# Patient Record
Sex: MALE | Race: WHITE | NOT HISPANIC OR LATINO | Employment: FULL TIME | URBAN - METROPOLITAN AREA
[De-identification: names, ages, dates, MRNs, and addresses within clinical notes are randomized per-mention and may not be internally consistent; named-entity substitution may affect disease eponyms.]

---

## 2022-10-05 ENCOUNTER — TELEPHONE (OUTPATIENT)
Dept: SCHEDULING | Facility: IMAGING CENTER | Age: 57
End: 2022-10-05

## 2022-10-12 ENCOUNTER — OFFICE VISIT (OUTPATIENT)
Dept: MEDICAL GROUP | Facility: PHYSICIAN GROUP | Age: 57
End: 2022-10-12
Payer: COMMERCIAL

## 2022-10-12 VITALS
SYSTOLIC BLOOD PRESSURE: 120 MMHG | OXYGEN SATURATION: 97 % | TEMPERATURE: 98.5 F | WEIGHT: 277.8 LBS | BODY MASS INDEX: 33.83 KG/M2 | HEART RATE: 69 BPM | HEIGHT: 76 IN | DIASTOLIC BLOOD PRESSURE: 70 MMHG

## 2022-10-12 DIAGNOSIS — Z12.11 SCREENING FOR COLON CANCER: ICD-10-CM

## 2022-10-12 DIAGNOSIS — R60.0 LOWER EXTREMITY EDEMA: ICD-10-CM

## 2022-10-12 DIAGNOSIS — E66.9 OBESITY (BMI 30-39.9): ICD-10-CM

## 2022-10-12 PROBLEM — K64.8 OTHER HEMORRHOIDS: Status: ACTIVE | Noted: 2021-04-30

## 2022-10-12 PROCEDURE — 99204 OFFICE O/P NEW MOD 45 MIN: CPT

## 2022-10-12 ASSESSMENT — PATIENT HEALTH QUESTIONNAIRE - PHQ9: CLINICAL INTERPRETATION OF PHQ2 SCORE: 0

## 2022-10-13 NOTE — ASSESSMENT & PLAN NOTE
Subacute condition of uncertain prognosis  - Strongly recommend use of compression stockings daily when up and ambulatory  - Strongly recommend offloading getting both legs above the level of the heart for at least 1 to 2 hours daily  - Recommend low-salt diet  - If condition fails to improve with conservative treatment measures we will likely refer to vascular medicine in the future

## 2022-10-13 NOTE — PROGRESS NOTES
"Subjective:     CC:  Diagnoses of Obesity (BMI 30-39.9), Screening for colon cancer, and Lower extremity edema were pertinent to this visit.    HISTORY OF THE PRESENT ILLNESS: Patient is a 56 y.o. male. This pleasant patient is here today to establish care and discuss the following problems:    Problem   Obesity (Bmi 30-39.9)    This is a chronic condition.   Max weight: 287  Current weight: 277  BMI: 33.81  Diet: mostly healthy, denies fast food or sodas  Exercise: golf  Goal Weight: 255       Lower Extremity Edema    2-3 months of lower extremity swelling  - He has tried compression stockings but finds them cumbersome and difficult to maneuver.  He reports when he elevates his legs the edema subsides.  He also reports when he sleeps at night in a supine position he wakes up and there is no ankle edema.  -Denies any chest pain or shortness of breath, or calf pain       Other Hemorrhoids       Health Maintenance: Refuses vaccine administration at this time    ROS:   Review of Systems   Cardiovascular:  Positive for leg swelling (Bilateral lower extremities).   All other systems reviewed and are negative.      Objective:     Exam: /70 (BP Location: Left arm, Patient Position: Sitting, BP Cuff Size: Large adult)   Pulse 69   Temp 36.9 °C (98.5 °F) (Temporal)   Ht 1.93 m (6' 4\")   Wt (!) 126 kg (277 lb 12.8 oz)   SpO2 97%  Body mass index is 33.81 kg/m².    Physical Exam  Constitutional:       Appearance: Normal appearance.   HENT:      Head: Normocephalic.   Eyes:      Conjunctiva/sclera: Conjunctivae normal.      Pupils: Pupils are equal, round, and reactive to light.   Cardiovascular:      Rate and Rhythm: Normal rate and regular rhythm.      Heart sounds: No murmur heard.  Pulmonary:      Effort: Pulmonary effort is normal. No respiratory distress.      Breath sounds: Normal breath sounds.   Musculoskeletal:         General: Normal range of motion.      Right lower leg: Edema (1+) present.      Left " lower leg: Edema (1+) present.   Skin:     General: Skin is warm and dry.      Comments: Multiple bilateral lower extremity varicosities, right greater than left.  Also shows evidence of venous stasis ulceration with 1+ edema right lower and left lower extremities at the ankles   Neurological:      General: No focal deficit present.      Mental Status: He is alert and oriented to person, place, and time.   Psychiatric:         Mood and Affect: Mood normal.         Behavior: Behavior normal.         Labs: No recent labs    Assessment & Plan: Medical Decision Making   56 y.o. male with the following -    Problem List Items Addressed This Visit       Obesity (BMI 30-39.9)     Chronic, unstable  -Continue efforts towards healthy diet and exercise as discussed         Relevant Orders    Patient identified as having weight management issue.  Appropriate orders and counseling given.    HEMOGLOBIN A1C    CBC WITHOUT DIFFERENTIAL    Comp Metabolic Panel    Lipid Profile    TSH WITH REFLEX TO FT4    Referral to Nutrition Services    Lower extremity edema     Subacute condition of uncertain prognosis  - Strongly recommend use of compression stockings daily when up and ambulatory  - Strongly recommend offloading getting both legs above the level of the heart for at least 1 to 2 hours daily  - Recommend low-salt diet  - If condition fails to improve with conservative treatment measures we will likely refer to vascular medicine in the future          Other Visit Diagnoses       Screening for colon cancer        Relevant Orders    COLOGUARD (FIT DNA)            Differential diagnosis, natural history, supportive care, and indications for immediate follow-up discussed.  Shared decision making approach was utilized, and patient is amendable with plan of care.  Patient understands to return to clinic or go to the emergency department if symptoms worsen. All questions and concerns addressed.      Return in about 4 weeks (around  11/9/2022).    Please note that this dictation was created using voice recognition software. I have made every reasonable attempt to correct obvious errors, but I expect that there are errors of grammar and possibly content that I did not discover before finalizing the note.

## 2022-10-17 ENCOUNTER — HOSPITAL ENCOUNTER (OUTPATIENT)
Dept: LAB | Facility: MEDICAL CENTER | Age: 57
End: 2022-10-17
Payer: COMMERCIAL

## 2022-10-17 DIAGNOSIS — E66.9 OBESITY (BMI 30-39.9): ICD-10-CM

## 2022-10-17 LAB
ALBUMIN SERPL BCP-MCNC: 4.7 G/DL (ref 3.2–4.9)
ALBUMIN/GLOB SERPL: 2 G/DL
ALP SERPL-CCNC: 48 U/L (ref 30–99)
ALT SERPL-CCNC: 30 U/L (ref 2–50)
ANION GAP SERPL CALC-SCNC: 11 MMOL/L (ref 7–16)
AST SERPL-CCNC: 22 U/L (ref 12–45)
BILIRUB SERPL-MCNC: 0.7 MG/DL (ref 0.1–1.5)
BUN SERPL-MCNC: 16 MG/DL (ref 8–22)
CALCIUM SERPL-MCNC: 9.4 MG/DL (ref 8.5–10.5)
CHLORIDE SERPL-SCNC: 101 MMOL/L (ref 96–112)
CHOLEST SERPL-MCNC: 192 MG/DL (ref 100–199)
CO2 SERPL-SCNC: 26 MMOL/L (ref 20–33)
CREAT SERPL-MCNC: 0.94 MG/DL (ref 0.5–1.4)
ERYTHROCYTE [DISTWIDTH] IN BLOOD BY AUTOMATED COUNT: 42.7 FL (ref 35.9–50)
EST. AVERAGE GLUCOSE BLD GHB EST-MCNC: 100 MG/DL
GFR SERPLBLD CREATININE-BSD FMLA CKD-EPI: 96 ML/MIN/1.73 M 2
GLOBULIN SER CALC-MCNC: 2.3 G/DL (ref 1.9–3.5)
GLUCOSE SERPL-MCNC: 93 MG/DL (ref 65–99)
HBA1C MFR BLD: 5.1 % (ref 4–5.6)
HCT VFR BLD AUTO: 45.3 % (ref 42–52)
HDLC SERPL-MCNC: 41 MG/DL
HGB BLD-MCNC: 15.7 G/DL (ref 14–18)
LDLC SERPL CALC-MCNC: 107 MG/DL
MCH RBC QN AUTO: 32.4 PG (ref 27–33)
MCHC RBC AUTO-ENTMCNC: 34.7 G/DL (ref 33.7–35.3)
MCV RBC AUTO: 93.6 FL (ref 81.4–97.8)
PLATELET # BLD AUTO: 150 K/UL (ref 164–446)
PMV BLD AUTO: 11.6 FL (ref 9–12.9)
POTASSIUM SERPL-SCNC: 5 MMOL/L (ref 3.6–5.5)
PROT SERPL-MCNC: 7 G/DL (ref 6–8.2)
RBC # BLD AUTO: 4.84 M/UL (ref 4.7–6.1)
SODIUM SERPL-SCNC: 138 MMOL/L (ref 135–145)
TRIGL SERPL-MCNC: 222 MG/DL (ref 0–149)
TSH SERPL DL<=0.005 MIU/L-ACNC: 1.8 UIU/ML (ref 0.38–5.33)
WBC # BLD AUTO: 5.7 K/UL (ref 4.8–10.8)

## 2022-10-17 PROCEDURE — 85027 COMPLETE CBC AUTOMATED: CPT

## 2022-10-17 PROCEDURE — 83036 HEMOGLOBIN GLYCOSYLATED A1C: CPT

## 2022-10-17 PROCEDURE — 80061 LIPID PANEL: CPT

## 2022-10-17 PROCEDURE — 80053 COMPREHEN METABOLIC PANEL: CPT

## 2022-10-17 PROCEDURE — 84443 ASSAY THYROID STIM HORMONE: CPT

## 2022-10-17 PROCEDURE — 36415 COLL VENOUS BLD VENIPUNCTURE: CPT

## 2022-11-09 ENCOUNTER — OFFICE VISIT (OUTPATIENT)
Dept: MEDICAL GROUP | Facility: PHYSICIAN GROUP | Age: 57
End: 2022-11-09
Payer: COMMERCIAL

## 2022-11-09 VITALS
HEIGHT: 76 IN | TEMPERATURE: 98.2 F | BODY MASS INDEX: 34.1 KG/M2 | HEART RATE: 80 BPM | WEIGHT: 280 LBS | DIASTOLIC BLOOD PRESSURE: 70 MMHG | OXYGEN SATURATION: 96 % | SYSTOLIC BLOOD PRESSURE: 118 MMHG

## 2022-11-09 DIAGNOSIS — E66.9 OBESITY (BMI 30-39.9): ICD-10-CM

## 2022-11-09 DIAGNOSIS — D69.6 LOW PLATELET COUNT (HCC): ICD-10-CM

## 2022-11-09 DIAGNOSIS — E78.5 DYSLIPIDEMIA: ICD-10-CM

## 2022-11-09 DIAGNOSIS — R60.0 LOWER EXTREMITY EDEMA: ICD-10-CM

## 2022-11-09 PROCEDURE — 99214 OFFICE O/P EST MOD 30 MIN: CPT

## 2022-11-09 ASSESSMENT — FIBROSIS 4 INDEX: FIB4 SCORE: 1.47

## 2022-11-09 ASSESSMENT — ENCOUNTER SYMPTOMS: SHORTNESS OF BREATH: 0

## 2022-11-09 NOTE — ASSESSMENT & PLAN NOTE
Undiagnosed new condition-uncertain prognosis  -Will recheck platelets at next lab draw in 6 months

## 2022-11-09 NOTE — ASSESSMENT & PLAN NOTE
Chronic, unstable  -Recommend diligent efforts towards diet and exercise as discussed  -Recommend 30 minutes sustained cardiovascular activity daily

## 2022-11-09 NOTE — ASSESSMENT & PLAN NOTE
Subacute condition of uncertain prognosis  - Strongly recommend use of compression stockings daily when up and ambulatory  - Strongly recommend offloading getting both legs above the level of the heart for at least 1 to 2 hours daily  - Recommend low-salt diet  -Referral to vascular medicine  To cardiology

## 2022-11-09 NOTE — ASSESSMENT & PLAN NOTE
Chronic, unstable  -Continue efforts towards healthy diet and exercise as discussed  -Nutritional consult placed at last appointment.  Patient needs to call to schedule.

## 2022-11-09 NOTE — PROGRESS NOTES
"Subjective:     CC: lab review and lower extremity swelling    HPI:   Reinaldo presents today with    Problem   Dyslipidemia    Reports to eat relatively healthy.  -Patient active, plays golf 4 days per week walks and rides in the cart     Latest Reference Range & Units 10/17/22 12:36   Cholesterol,Tot 100 - 199 mg/dL 192   Triglycerides 0 - 149 mg/dL 222 (H)   HDL >=40 mg/dL 41   LDL <100 mg/dL 107 (H)        Low Platelet Count (Hcc)    Platelet level at last lab draw 150.  Patient denies any history of easy bruising, or active bleeding.  He has never been told that he has low platelets.  We will continue to watch trends     Obesity (Bmi 30-39.9)    This is a chronic condition.   Max weight: 287  Current weight: 280  BMI: 34.08  Diet: mostly healthy, denies fast food or sodas  Exercise: golf  Goal Weight: 255       Lower Extremity Edema    2-3 months of lower extremity swelling  - He has tried compression stockings but finds them cumbersome and difficult to maneuver.  He reports when he elevates his legs the edema subsides.  He also reports when he sleeps at night in a supine position he wakes up and there is no ankle edema.  -Denies any chest pain or shortness of breath, or calf pain           Health Maintenance: declines vaccines today    ROS:  Review of Systems   Respiratory:  Negative for shortness of breath.    Cardiovascular:  Positive for leg swelling. Negative for chest pain.   All other systems reviewed and are negative.    Objective:     Exam:  /70 (BP Location: Left arm, Patient Position: Sitting, BP Cuff Size: Large adult)   Pulse 80   Temp 36.8 °C (98.2 °F) (Temporal)   Ht 1.93 m (6' 4\")   Wt (!) 127 kg (280 lb)   SpO2 96%   BMI 34.08 kg/m²  Body mass index is 34.08 kg/m².    Physical Exam    Labs:   Hospital Outpatient Visit on 10/17/2022   Component Date Value    TSH 10/17/2022 1.800     Cholesterol,Tot 10/17/2022 192     Triglycerides 10/17/2022 222 (H)     HDL 10/17/2022 41     LDL " 10/17/2022 107 (H)     Sodium 10/17/2022 138     Potassium 10/17/2022 5.0     Chloride 10/17/2022 101     Co2 10/17/2022 26     Anion Gap 10/17/2022 11.0     Glucose 10/17/2022 93     Bun 10/17/2022 16     Creatinine 10/17/2022 0.94     Calcium 10/17/2022 9.4     AST(SGOT) 10/17/2022 22     ALT(SGPT) 10/17/2022 30     Alkaline Phosphatase 10/17/2022 48     Total Bilirubin 10/17/2022 0.7     Albumin 10/17/2022 4.7     Total Protein 10/17/2022 7.0     Globulin 10/17/2022 2.3     A-G Ratio 10/17/2022 2.0     WBC 10/17/2022 5.7     RBC 10/17/2022 4.84     Hemoglobin 10/17/2022 15.7     Hematocrit 10/17/2022 45.3     MCV 10/17/2022 93.6     MCH 10/17/2022 32.4     MCHC 10/17/2022 34.7     RDW 10/17/2022 42.7     Platelet Count 10/17/2022 150 (L)     MPV 10/17/2022 11.6     Glycohemoglobin 10/17/2022 5.1     Est Avg Glucose 10/17/2022 100     GFR (CKD-EPI) 10/17/2022 96          Assessment & Plan: Medical Decision Making     57 y.o. male with the following -     Problem List Items Addressed This Visit       Obesity (BMI 30-39.9)     Chronic, unstable  -Continue efforts towards healthy diet and exercise as discussed  -Nutritional consult placed at last appointment.  Patient needs to call to schedule.         Lower extremity edema     Subacute condition of uncertain prognosis  - Strongly recommend use of compression stockings daily when up and ambulatory  - Strongly recommend offloading getting both legs above the level of the heart for at least 1 to 2 hours daily  - Recommend low-salt diet  -Referral to vascular medicine  To cardiology         Relevant Orders    REFERRAL TO CARDIOLOGY    Dyslipidemia     Chronic, unstable  -Recommend diligent efforts towards diet and exercise as discussed  -Recommend 30 minutes sustained cardiovascular activity daily         Relevant Orders    Lipid Profile    Low platelet count (HCC)     Undiagnosed new condition-uncertain prognosis  -Will recheck platelets at next lab draw in 6  months         Relevant Orders    CBC WITH DIFFERENTIAL       Differential diagnosis, natural history, supportive care, and indications for immediate follow-up discussed.  Shared decision making approach utilized, and patient is amendable with plan of care.  Patient understands to return to clinic or go to the emergency department if symptoms worsen. All questions and concerns addressed.    Return in about 6 months (around 5/9/2023) for F/U Lab Review.    Please note that this dictation was created using voice recognition software. I have made every reasonable attempt to correct obvious errors, but I expect that there are errors of grammar and possibly content that I did not discover before finalizing the note.

## 2023-01-31 ENCOUNTER — OFFICE VISIT (OUTPATIENT)
Dept: CARDIOLOGY | Facility: MEDICAL CENTER | Age: 58
End: 2023-01-31
Payer: COMMERCIAL

## 2023-01-31 VITALS
BODY MASS INDEX: 35.07 KG/M2 | SYSTOLIC BLOOD PRESSURE: 130 MMHG | DIASTOLIC BLOOD PRESSURE: 80 MMHG | RESPIRATION RATE: 17 BRPM | HEART RATE: 74 BPM | WEIGHT: 288 LBS | HEIGHT: 76 IN | OXYGEN SATURATION: 97 %

## 2023-01-31 DIAGNOSIS — M79.604 PAIN IN BOTH LOWER EXTREMITIES: ICD-10-CM

## 2023-01-31 DIAGNOSIS — M79.605 PAIN IN BOTH LOWER EXTREMITIES: ICD-10-CM

## 2023-01-31 DIAGNOSIS — I83.813 VARICOSE VEINS OF BOTH LOWER EXTREMITIES WITH PAIN: ICD-10-CM

## 2023-01-31 LAB — EKG IMPRESSION: NORMAL

## 2023-01-31 PROCEDURE — 93000 ELECTROCARDIOGRAM COMPLETE: CPT | Performed by: INTERNAL MEDICINE

## 2023-01-31 PROCEDURE — 99204 OFFICE O/P NEW MOD 45 MIN: CPT | Performed by: INTERNAL MEDICINE

## 2023-01-31 ASSESSMENT — ENCOUNTER SYMPTOMS
EYE DISCHARGE: 0
DIZZINESS: 0
PALPITATIONS: 0
DOUBLE VISION: 0
FALLS: 0
BLOOD IN STOOL: 0
HALLUCINATIONS: 0
NAUSEA: 0
BRUISES/BLEEDS EASILY: 0
CHILLS: 0
MYALGIAS: 0
ORTHOPNEA: 0
SHORTNESS OF BREATH: 0
PND: 0
DEPRESSION: 0
EYE PAIN: 0
LOSS OF CONSCIOUSNESS: 0
VOMITING: 0
BLURRED VISION: 0
SPEECH CHANGE: 0
HEADACHES: 0
CLAUDICATION: 0
SENSORY CHANGE: 0
WEIGHT LOSS: 0
COUGH: 0
FEVER: 0
ABDOMINAL PAIN: 0

## 2023-01-31 ASSESSMENT — FIBROSIS 4 INDEX: FIB4 SCORE: 1.47

## 2023-01-31 NOTE — PROGRESS NOTES
Chief Complaint   Patient presents with    Edema     F/V DX: Lower extremity edema        Subjective     Reinaldo Prabhakar is a 57 y.o. male who presents today for evaluation due to persistent lower extremities edema and pain.  He does have significant varicose veins and he would like to seek treatment for his symptomatology.    No prior vein treatments.    Patient has been using compression stocking with some relief but not total relief.    I have personally interpreted EKG today with patient, there is no evidence of acute coronary syndrome, no evidence of prior infarct, normal NE and QT interval, no significant conduction disease. Sinus rhythm.    Past Medical History:   Diagnosis Date    Hemorrhoids      Past Surgical History:   Procedure Laterality Date    TONSILLECTOMY       Family History   Problem Relation Age of Onset    No Known Problems Mother     Heart Disease Father     Heart Disease Brother     Diabetes Brother     Hypertension Paternal Grandfather      Social History     Socioeconomic History    Marital status:      Spouse name: Not on file    Number of children: Not on file    Years of education: Not on file    Highest education level: Not on file   Occupational History    Not on file   Tobacco Use    Smoking status: Some Days     Types: Cigars    Smokeless tobacco: Never   Vaping Use    Vaping Use: Never used   Substance and Sexual Activity    Alcohol use: Yes     Alcohol/week: 7.2 oz     Types: 12 Cans of beer per week    Drug use: Yes     Types: Marijuana    Sexual activity: Not on file   Other Topics Concern    Not on file   Social History Narrative    Not on file     Social Determinants of Health     Financial Resource Strain: Not on file   Food Insecurity: Not on file   Transportation Needs: Not on file   Physical Activity: Not on file   Stress: Not on file   Social Connections: Not on file   Intimate Partner Violence: Not on file   Housing Stability: Not on file     Not on File  No  "outpatient encounter medications on file as of 1/31/2023.     No facility-administered encounter medications on file as of 1/31/2023.     Review of Systems   Constitutional:  Negative for chills, fever, malaise/fatigue and weight loss.   HENT:  Negative for ear discharge, ear pain, hearing loss and nosebleeds.    Eyes:  Negative for blurred vision, double vision, pain and discharge.   Respiratory:  Negative for cough and shortness of breath.    Cardiovascular:  Positive for leg swelling. Negative for chest pain, palpitations, orthopnea, claudication and PND.   Gastrointestinal:  Negative for abdominal pain, blood in stool, melena, nausea and vomiting.   Genitourinary:  Negative for dysuria and hematuria.   Musculoskeletal:  Negative for falls, joint pain and myalgias.   Skin:  Negative for itching and rash.   Neurological:  Negative for dizziness, sensory change, speech change, loss of consciousness and headaches.   Endo/Heme/Allergies:  Negative for environmental allergies. Does not bruise/bleed easily.   Psychiatric/Behavioral:  Negative for depression, hallucinations and suicidal ideas.             Objective     /80 (BP Location: Left arm, Patient Position: Sitting, BP Cuff Size: Adult)   Pulse 74   Resp 17   Ht 1.93 m (6' 4\")   Wt (!) 131 kg (288 lb)   SpO2 97%   BMI 35.06 kg/m²     Physical Exam  Vitals and nursing note reviewed.   Constitutional:       General: He is not in acute distress.     Appearance: He is not diaphoretic.   HENT:      Head: Normocephalic and atraumatic.      Right Ear: External ear normal.      Left Ear: External ear normal.      Nose: No congestion or rhinorrhea.   Eyes:      General:         Right eye: No discharge.         Left eye: No discharge.   Neck:      Thyroid: No thyromegaly.      Vascular: No JVD.   Cardiovascular:      Rate and Rhythm: Normal rate and regular rhythm.      Pulses: Normal pulses.   Pulmonary:      Effort: No respiratory distress.   Abdominal:      " General: There is no distension.      Tenderness: There is no abdominal tenderness.   Musculoskeletal:         General: No swelling or tenderness.      Right lower leg: No edema.      Left lower leg: No edema.      Comments: + varicose veins without evidence of ulcer, + discoloration.     Skin:     General: Skin is warm and dry.   Neurological:      Mental Status: He is alert and oriented to person, place, and time.      Cranial Nerves: No cranial nerve deficit.   Psychiatric:         Behavior: Behavior normal.              Assessment & Plan     1. Pain in both lower extremities  EKG      2. Varicose veins of both lower extremities with pain  US-EXTREMITY VENOUS LOWER BILAT          Medical Decision Making: Today's Assessment/Status/Plan:   We will also check lower extremities venous duplex to assess the anatomy for lower extremities venous system.  In the meantime, I advised patient to continue to use his compression stocking.  We will plan for treatment based on results of his duplex.

## 2023-02-07 ENCOUNTER — TELEPHONE (OUTPATIENT)
Dept: CARDIOLOGY | Facility: MEDICAL CENTER | Age: 58
End: 2023-02-07
Payer: COMMERCIAL

## 2023-02-07 NOTE — TELEPHONE ENCOUNTER
Spoke to patient in regards to US ordered per  for upcoming appt. Per patient will have US completed tomorrow 02/08/2023 at [facility], will request prior to appt.

## 2023-02-10 DIAGNOSIS — I83.813 VARICOSE VEINS OF BOTH LOWER EXTREMITIES WITH PAIN: ICD-10-CM

## 2023-02-15 ENCOUNTER — TELEPHONE (OUTPATIENT)
Dept: CARDIOLOGY | Facility: MEDICAL CENTER | Age: 58
End: 2023-02-15

## 2023-02-15 ENCOUNTER — OFFICE VISIT (OUTPATIENT)
Dept: CARDIOLOGY | Facility: MEDICAL CENTER | Age: 58
End: 2023-02-15
Payer: COMMERCIAL

## 2023-02-15 VITALS
WEIGHT: 281 LBS | RESPIRATION RATE: 18 BRPM | DIASTOLIC BLOOD PRESSURE: 88 MMHG | HEIGHT: 76 IN | OXYGEN SATURATION: 99 % | SYSTOLIC BLOOD PRESSURE: 134 MMHG | BODY MASS INDEX: 34.22 KG/M2 | HEART RATE: 65 BPM

## 2023-02-15 DIAGNOSIS — M79.605 PAIN IN BOTH LOWER EXTREMITIES: ICD-10-CM

## 2023-02-15 DIAGNOSIS — M79.604 PAIN IN BOTH LOWER EXTREMITIES: ICD-10-CM

## 2023-02-15 DIAGNOSIS — I83.813 VARICOSE VEINS OF BOTH LOWER EXTREMITIES WITH PAIN: ICD-10-CM

## 2023-02-15 PROCEDURE — 99214 OFFICE O/P EST MOD 30 MIN: CPT | Performed by: INTERNAL MEDICINE

## 2023-02-15 ASSESSMENT — ENCOUNTER SYMPTOMS
HEADACHES: 0
SPEECH CHANGE: 0
ORTHOPNEA: 0
SHORTNESS OF BREATH: 0
EYE DISCHARGE: 0
VOMITING: 0
DIZZINESS: 0
EYE PAIN: 0
COUGH: 0
DOUBLE VISION: 0
CHILLS: 0
WEIGHT LOSS: 0
PALPITATIONS: 0
BLOOD IN STOOL: 0
BLURRED VISION: 0
FALLS: 0
MYALGIAS: 0
DEPRESSION: 0
ABDOMINAL PAIN: 0
CLAUDICATION: 0
FEVER: 0
HALLUCINATIONS: 0
PND: 0
BRUISES/BLEEDS EASILY: 0
NAUSEA: 0
LOSS OF CONSCIOUSNESS: 0
SENSORY CHANGE: 0

## 2023-02-15 ASSESSMENT — FIBROSIS 4 INDEX: FIB4 SCORE: 1.47

## 2023-02-15 NOTE — PROGRESS NOTES
Chief Complaint   Patient presents with    Edema     F/V Dx: Lower extremity edema    Dyslipidemia       Subjective     Reinaldo Prabhakar is a 57 y.o. male who presents today for evaluation due to persistent lower extremities edema and pain.  He does have significant varicose veins and he would like to seek treatment for his symptomatology.    No prior vein treatments.    Patient has been using compression stocking with some relief but not total relief.    I have personally interpreted EKG today with patient, there is no evidence of acute coronary syndrome, no evidence of prior infarct, normal [place] and QT interval, no significant conduction disease. Sinus rhythm.    At this time, we do have the report from [facility] in terms of his venous duplex report.  Which showed significant reflux disease in bilateral great saphenous veins from the junction all the way down to the distal calf.  I personally interpreted and reviewed the report myself.  However, at this time, we do not have the actual images of the study from [facility] for me to look at.    Past Medical History:   Diagnosis Date    Hemorrhoids      Past Surgical History:   Procedure Laterality Date    TONSILLECTOMY       Family History   Problem Relation Age of Onset    No Known Problems Mother     Heart Disease Father     Heart Disease Brother     Diabetes Brother     Hypertension Paternal Grandfather      Social History     Socioeconomic History    Marital status:      Spouse name: Not on file    Number of children: Not on file    Years of education: Not on file    Highest education level: Not on file   Occupational History    Not on file   Tobacco Use    Smoking status: Some Days     Types: Cigars    Smokeless tobacco: Never   Vaping Use    Vaping Use: Never used   Substance and Sexual Activity    Alcohol use: Yes     Alcohol/week: 7.2 oz     Types: 12 Cans of beer per week    Drug use: Yes     Types: Marijuana    Sexual  "activity: Not on file   Other Topics Concern    Not on file   Social History Narrative    Not on file     Social Determinants of Health     Financial Resource Strain: Not on file   Food Insecurity: Not on file   Transportation Needs: Not on file   Physical Activity: Not on file   Stress: Not on file   Social Connections: Not on file   Intimate Partner Violence: Not on file   Housing Stability: Not on file     No Known Allergies  No outpatient encounter medications on file as of 2/15/2023.     No facility-administered encounter medications on file as of 2/15/2023.     Review of Systems   Constitutional:  Negative for chills, fever, malaise/fatigue and weight loss.   HENT:  Negative for ear discharge, ear pain, hearing loss and nosebleeds.    Eyes:  Negative for blurred vision, double vision, pain and discharge.   Respiratory:  Negative for cough and shortness of breath.    Cardiovascular:  Positive for leg swelling. Negative for chest pain, palpitations, orthopnea, claudication and PND.   Gastrointestinal:  Negative for abdominal pain, blood in stool, melena, nausea and vomiting.   Genitourinary:  Negative for dysuria and hematuria.   Musculoskeletal:  Negative for falls, joint pain and myalgias.   Skin:  Negative for itching and rash.   Neurological:  Negative for dizziness, sensory change, speech change, loss of consciousness and headaches.   Endo/Heme/Allergies:  Negative for environmental allergies. Does not bruise/bleed easily.   Psychiatric/Behavioral:  Negative for depression, hallucinations and suicidal ideas.             Objective     /88 (BP Location: Left arm, Patient Position: Sitting, BP Cuff Size: Adult)   Pulse 65   Resp 18   Ht 1.93 m (6' 4\")   Wt (!) 127 kg (281 lb)   SpO2 99%   BMI 34.20 kg/m²     Physical Exam  Vitals and nursing note reviewed.   Constitutional:       General: He is not in acute distress.     Appearance: He is not diaphoretic.   HENT:      Head: Normocephalic and " atraumatic.      Right Ear: External ear normal.      Left Ear: External ear normal.      Nose: No congestion or rhinorrhea.   Eyes:      General:         Right eye: No discharge.         Left eye: No discharge.   Neck:      Thyroid: No thyromegaly.      Vascular: No JVD.   Cardiovascular:      Rate and Rhythm: Normal rate and regular rhythm.      Pulses: Normal pulses.   Pulmonary:      Effort: No respiratory distress.   Abdominal:      General: There is no distension.      Tenderness: There is no abdominal tenderness.   Musculoskeletal:         General: No swelling or tenderness.      Right lower leg: No edema.      Left lower leg: No edema.      Comments: + varicose veins without evidence of ulcer, + discoloration.     Skin:     General: Skin is warm and dry.   Neurological:      Mental Status: He is alert and oriented to person, place, and time.      Cranial Nerves: No cranial nerve deficit.   Psychiatric:         Behavior: Behavior normal.              Assessment & Plan     1. Varicose veins of both lower extremities with pain        2. Pain in both lower extremities            Medical Decision Making: Today's Assessment/Status/Plan:     At this time, I would like to obtain the actual images of his study from [facility] for thorough review and decision making.  Overall however, based on his symptomatology along with the report stating significant findings of reflux disease in bilateral greater saphenous veins, I do think that patient meets criteria for intervention.  However, we will wait until after obtaining the actual images to make such decision definitively.    In the meantime patient would continue to use his compression stockings and increase activities.

## 2023-02-15 NOTE — TELEPHONE ENCOUNTER
Requested medical records from [facility].  Received confirmation fax.

## 2023-03-14 DIAGNOSIS — I83.893 VARICOSE VEINS OF BOTH LEGS WITH EDEMA: ICD-10-CM

## 2023-03-15 ENCOUNTER — TELEPHONE (OUTPATIENT)
Dept: MEDICAL GROUP | Facility: PHYSICIAN GROUP | Age: 58
End: 2023-03-15
Payer: COMMERCIAL

## 2023-03-15 NOTE — TELEPHONE ENCOUNTER
Patient came down to the office, to request the provider to send a Referral for the specific Cardiologist ( [place]'s Dr. Reinaldo Maravilla). Please reach out to the patient, as soon as possible, as he needed to be seen right away.. Patient would be expecting a call from the MA and see where we go from there.. Thank you.

## 2023-03-16 ENCOUNTER — TELEPHONE (OUTPATIENT)
Dept: MEDICAL GROUP | Facility: PHYSICIAN GROUP | Age: 58
End: 2023-03-16
Payer: COMMERCIAL

## 2023-04-15 ENCOUNTER — OFFICE VISIT (OUTPATIENT)
Dept: URGENT CARE | Facility: CLINIC | Age: 58
End: 2023-04-15
Payer: COMMERCIAL

## 2023-04-15 VITALS
SYSTOLIC BLOOD PRESSURE: 130 MMHG | RESPIRATION RATE: 18 BRPM | HEART RATE: 94 BPM | OXYGEN SATURATION: 96 % | BODY MASS INDEX: 34.1 KG/M2 | DIASTOLIC BLOOD PRESSURE: 80 MMHG | TEMPERATURE: 97.3 F | WEIGHT: 280 LBS | HEIGHT: 76 IN

## 2023-04-15 DIAGNOSIS — M10.9 ACUTE GOUT INVOLVING TOE OF RIGHT FOOT, UNSPECIFIED CAUSE: ICD-10-CM

## 2023-04-15 DIAGNOSIS — U07.1 COVID-19: ICD-10-CM

## 2023-04-15 PROCEDURE — 99214 OFFICE O/P EST MOD 30 MIN: CPT | Performed by: PHYSICIAN ASSISTANT

## 2023-04-15 RX ORDER — METHYLPREDNISOLONE 4 MG/1
TABLET ORAL
Qty: 21 TABLET | Refills: 0 | Status: SHIPPED | OUTPATIENT
Start: 2023-04-15

## 2023-04-15 ASSESSMENT — ENCOUNTER SYMPTOMS
DIZZINESS: 0
SINUS PAIN: 0
CHILLS: 0
WHEEZING: 0
SORE THROAT: 0
ABDOMINAL PAIN: 0
HEADACHES: 0
DIARRHEA: 0
VOMITING: 0
DIAPHORESIS: 0
SHORTNESS OF BREATH: 0
COUGH: 1
SPUTUM PRODUCTION: 0
FEVER: 0
NAUSEA: 0
PALPITATIONS: 0
MYALGIAS: 0

## 2023-04-15 ASSESSMENT — FIBROSIS 4 INDEX: FIB4 SCORE: 1.47

## 2023-04-15 NOTE — PROGRESS NOTES
Subjective:     CHIEF COMPLAINT  Chief Complaint   Patient presents with    Gout     X 1 day, RT leg gout flare up. Tested positive for covid yesterday having no symptoms.       HPI  Reinaldo Prabhakar is a very pleasant 57 y.o. male who presents to clinic to the clinic with a believe gout flareup starting last night.  Patient has been experiencing redness, swelling and tenderness to his right great toe.  Patient states symptoms started shortly after eating red meat.  He has had multiple gout flares in the past and this feels similar.  Denies any preceding injury or trauma.  No skin break or abrasion.  Area is sensitive to light touch.  Unable to keep his foot in the covers last night due to pain.  He has been taking Tylenol and anti-inflammatories without improvement.  He also informs me he tested positive for COVID-19 via home testing this morning.  He has had mild symptoms x3 days.  Currently has a mild cough that is dry nonproductive.  No shortness of breath or chest pain.  He has been afebrile.    REVIEW OF SYSTEMS  Review of Systems   Constitutional:  Negative for chills, diaphoresis, fever and malaise/fatigue.   HENT:  Positive for congestion. Negative for ear pain, sinus pain and sore throat.    Respiratory:  Positive for cough. Negative for sputum production, shortness of breath and wheezing.    Cardiovascular:  Negative for chest pain and palpitations.   Gastrointestinal:  Negative for abdominal pain, diarrhea, nausea and vomiting.   Musculoskeletal:  Positive for joint pain. Negative for myalgias.   Skin:  Positive for rash.   Neurological:  Negative for dizziness and headaches.     PAST MEDICAL HISTORY  Patient Active Problem List    Diagnosis Date Noted    Dyslipidemia 11/09/2022    Low platelet count (HCC) 11/09/2022    Obesity (BMI 30-39.9) 10/12/2022    Lower extremity edema 10/12/2022    Other hemorrhoids 04/30/2021       SURGICAL HISTORY   has a past surgical history that includes  "tonsillectomy.    ALLERGIES  No Known Allergies    CURRENT MEDICATIONS  Home Medications       Reviewed by Sumit Gagnon P.A.-C. (Physician Assistant) on 04/15/23 at 1617  Med List Status: <None>     Medication Last Dose Status        Patient Saul Taking any Medications                           SOCIAL HISTORY  Social History     Tobacco Use    Smoking status: Former     Types: Cigars    Smokeless tobacco: Never   Vaping Use    Vaping Use: Never used   Substance and Sexual Activity    Alcohol use: Yes     Alcohol/week: 7.2 oz     Types: 12 Cans of beer per week    Drug use: Yes     Types: Marijuana    Sexual activity: Not on file       FAMILY HISTORY  Family History   Problem Relation Age of Onset    No Known Problems Mother     Heart Disease Father     Heart Disease Brother     Diabetes Brother     Hypertension Paternal Grandfather           Objective:     VITAL SIGNS: /80   Pulse 94   Temp 36.3 °C (97.3 °F) (Temporal)   Resp 18   Ht 1.93 m (6' 4\")   Wt (!) 127 kg (280 lb)   SpO2 96%   BMI 34.08 kg/m²     PHYSICAL EXAM  Physical Exam  Constitutional:       General: He is not in acute distress.     Appearance: Normal appearance. He is not ill-appearing, toxic-appearing or diaphoretic.   HENT:      Head: Normocephalic and atraumatic.      Right Ear: Tympanic membrane, ear canal and external ear normal.      Left Ear: Tympanic membrane, ear canal and external ear normal.      Nose: Congestion present. No rhinorrhea.      Mouth/Throat:      Mouth: Mucous membranes are moist.      Pharynx: No oropharyngeal exudate or posterior oropharyngeal erythema.   Eyes:      Conjunctiva/sclera: Conjunctivae normal.   Cardiovascular:      Rate and Rhythm: Normal rate and regular rhythm.      Pulses: Normal pulses.      Heart sounds: Normal heart sounds.   Pulmonary:      Effort: Pulmonary effort is normal.      Breath sounds: Normal breath sounds. No wheezing, rhonchi or rales.   Musculoskeletal:      Cervical back: " Normal range of motion. No muscular tenderness.      Comments: Right great toe: Mild edema no overlying erythema to the MTP joint.  Area is tender to light palpation.  No skin break or abrasion present.  Range of motion limited due to pain.  No ascending redness or streaking.   Lymphadenopathy:      Cervical: No cervical adenopathy.   Skin:     General: Skin is warm and dry.      Capillary Refill: Capillary refill takes less than 2 seconds.      Findings: Rash present.   Neurological:      Mental Status: He is alert.   Psychiatric:         Mood and Affect: Mood normal.         Thought Content: Thought content normal.       Assessment/Plan:     1. Acute gout involving toe of right foot, unspecified cause  - methylPREDNISolone (MEDROL DOSEPAK) 4 MG Tablet Therapy Pack; Follow schedule on package instructions.  Dispense: 21 Tablet; Refill: 0    2. COVID-19  - methylPREDNISolone (MEDROL DOSEPAK) 4 MG Tablet Therapy Pack; Follow schedule on package instructions.  Dispense: 21 Tablet; Refill: 0      MDM/Comments:    -Monitor for possible diet correlation and modify diet. Limit alcohol intake.  -Oral hydration.  -RICE Therapy: Rest, Ice, Compression, Elevation  -Tylenol as directed for pain.     Follow up with PCP. Follow up for persistent or worsening symptoms, fever, chills, signs of infection. Emergently for severe uncontrolled pain, neurovascular compromise (decreased sensation, motion, or circulation).     Differential diagnosis, natural history, supportive care, and indications for immediate follow-up discussed. All questions answered. Patient agrees with the plan of care.    Follow-up as needed if symptoms worsen or fail to improve to PCP, Urgent care or Emergency Room.    I have personally reviewed prior external notes and test results pertinent to today's visit.  I have independently reviewed and interpreted all diagnostics ordered during this urgent care acute visit.   Discussed management options  (risks,benefits, and alternatives to treatment). Pt expresses understanding and the treatment plan was agreed upon. Questions were encouraged and answered to pt's satisfaction.    Please note that this dictation was created using voice recognition software. I have made a reasonable attempt to correct obvious errors, but I expect that there are errors of grammar and possibly content that I did not discover before finalizing the note.

## 2024-07-15 ENCOUNTER — OFFICE VISIT (OUTPATIENT)
Dept: URGENT CARE | Facility: CLINIC | Age: 59
End: 2024-07-15

## 2024-07-15 VITALS
TEMPERATURE: 96.9 F | OXYGEN SATURATION: 100 % | HEART RATE: 67 BPM | HEIGHT: 76 IN | WEIGHT: 273 LBS | BODY MASS INDEX: 33.24 KG/M2 | SYSTOLIC BLOOD PRESSURE: 128 MMHG | RESPIRATION RATE: 19 BRPM | DIASTOLIC BLOOD PRESSURE: 90 MMHG

## 2024-07-15 DIAGNOSIS — M10.9 ACUTE GOUT INVOLVING TOE OF LEFT FOOT, UNSPECIFIED CAUSE: ICD-10-CM

## 2024-07-15 PROCEDURE — 99213 OFFICE O/P EST LOW 20 MIN: CPT | Performed by: PHYSICIAN ASSISTANT

## 2024-07-15 PROCEDURE — 3074F SYST BP LT 130 MM HG: CPT | Performed by: PHYSICIAN ASSISTANT

## 2024-07-15 PROCEDURE — 3080F DIAST BP >= 90 MM HG: CPT | Performed by: PHYSICIAN ASSISTANT

## 2024-07-15 RX ORDER — METHYLPREDNISOLONE 4 MG/1
TABLET ORAL
Qty: 21 TABLET | Refills: 0 | Status: SHIPPED | OUTPATIENT
Start: 2024-07-15

## 2024-07-15 ASSESSMENT — FIBROSIS 4 INDEX: FIB4 SCORE: 1.5

## 2024-07-15 ASSESSMENT — ENCOUNTER SYMPTOMS: NUMBNESS: 0

## 2025-02-24 ENCOUNTER — OFFICE VISIT (OUTPATIENT)
Dept: MEDICAL GROUP | Facility: PHYSICIAN GROUP | Age: 60
End: 2025-02-24
Payer: COMMERCIAL

## 2025-02-24 VITALS
SYSTOLIC BLOOD PRESSURE: 130 MMHG | OXYGEN SATURATION: 97 % | DIASTOLIC BLOOD PRESSURE: 80 MMHG | WEIGHT: 277.8 LBS | TEMPERATURE: 96.4 F | HEART RATE: 70 BPM | BODY MASS INDEX: 33.83 KG/M2 | HEIGHT: 76 IN

## 2025-02-24 DIAGNOSIS — G89.29 CHRONIC PAIN OF RIGHT KNEE: ICD-10-CM

## 2025-02-24 DIAGNOSIS — R06.83 SNORING: ICD-10-CM

## 2025-02-24 DIAGNOSIS — Z13.1 SCREENING FOR DIABETES MELLITUS: ICD-10-CM

## 2025-02-24 DIAGNOSIS — E66.9 OBESITY (BMI 30-39.9): ICD-10-CM

## 2025-02-24 DIAGNOSIS — E78.5 DYSLIPIDEMIA: ICD-10-CM

## 2025-02-24 DIAGNOSIS — R20.2 NUMBNESS AND TINGLING OF BOTH FEET: ICD-10-CM

## 2025-02-24 DIAGNOSIS — R20.0 NUMBNESS AND TINGLING OF BOTH FEET: ICD-10-CM

## 2025-02-24 DIAGNOSIS — Z00.00 HEALTH CARE MAINTENANCE: ICD-10-CM

## 2025-02-24 DIAGNOSIS — R40.0 DAYTIME SOMNOLENCE: ICD-10-CM

## 2025-02-24 DIAGNOSIS — M25.561 CHRONIC PAIN OF RIGHT KNEE: ICD-10-CM

## 2025-02-24 PROCEDURE — 99214 OFFICE O/P EST MOD 30 MIN: CPT

## 2025-02-24 PROCEDURE — 3075F SYST BP GE 130 - 139MM HG: CPT

## 2025-02-24 PROCEDURE — 3079F DIAST BP 80-89 MM HG: CPT

## 2025-02-24 ASSESSMENT — ENCOUNTER SYMPTOMS
NAUSEA: 0
BLURRED VISION: 0
VOMITING: 0
ABDOMINAL PAIN: 0
CONSTIPATION: 0
FEVER: 0
WEIGHT LOSS: 0
HEADACHES: 0
CHILLS: 0
TINGLING: 1
SHORTNESS OF BREATH: 0
DIZZINESS: 0
WEAKNESS: 0
COUGH: 0
DIARRHEA: 0
MYALGIAS: 0

## 2025-02-24 ASSESSMENT — PATIENT HEALTH QUESTIONNAIRE - PHQ9: CLINICAL INTERPRETATION OF PHQ2 SCORE: 0

## 2025-02-24 NOTE — PROGRESS NOTES
Verbal consent was acquired by the patient to use [employer] ambient listening note generation during this visit  Subjective:     CC:  Diagnoses of Snoring, Daytime somnolence, Chronic pain of right knee, Numbness and tingling of both feet, Obesity (BMI 30-39.9), Dyslipidemia, Screening for diabetes mellitus, and Health care maintenance were pertinent to this visit.    HISTORY OF THE PRESENT ILLNESS: Patient is a 59 y.o. male.   No problems updated.    History of Present Illness  The patient is a 59-year-old male who presents for evaluation of possible sleep apnea, knee stiffness, and bilateral feet numbness.    He has been informed by others that he snores during sleep. He reports experiencing daytime somnolence and has been observed by his wife to exhibit episodes of apnea during sleep. He is seeking a referral for a sleep study.    He had been experiencing leg swelling, which was previously evaluated at a vein clinic. An ultrasound revealed a fluid collection behind his knee, causing stiffness and discomfort. He has not undergone any imaging studies for his knee. The knee stiffness has been a persistent issue for several years, with varying degrees of severity. He reports no instability in the knee. He also reports no recent injuries, falls, or collisions involving the knee.    He has been experiencing numbness in his feet for several years, but reports no associated back pain. He describes the sensation as a combination of pain, numbness, and prickliness, which varies in intensity. He reports no sharp or shooting pain in the buttocks or upper legs. He reports no unusual bleeding or bruising. He maintains a healthy diet and uses a standing desk at work. Physical activity, such as golf, seems to alleviate the symptoms.    FAMILY HISTORY  His father and grandfather had diabetes. He does not have a family history of neuropathy.    ROS:   Review of Systems   Constitutional:  Positive for malaise/fatigue. Negative  "for chills, fever and weight loss.   Eyes:  Negative for blurred vision.   Respiratory:  Negative for cough and shortness of breath.    Cardiovascular:  Negative for chest pain.   Gastrointestinal:  Negative for abdominal pain, constipation, diarrhea, nausea and vomiting.   Musculoskeletal:  Negative for myalgias.   Neurological:  Positive for tingling. Negative for dizziness, weakness and headaches.         Objective:     Exam: /80 (BP Location: Left arm, Patient Position: Sitting, BP Cuff Size: Large adult)   Pulse 70   Temp (!) 35.8 °C (96.4 °F) (Temporal)   Ht 1.93 m (6' 4\")   Wt (!) 126 kg (277 lb 12.8 oz)   SpO2 97%  Body mass index is 33.81 kg/m².    Physical Exam  Constitutional:       Appearance: Normal appearance.   HENT:      Head: Normocephalic.   Eyes:      Conjunctiva/sclera: Conjunctivae normal.      Pupils: Pupils are equal, round, and reactive to light.   Cardiovascular:      Rate and Rhythm: Normal rate and regular rhythm.      Heart sounds: No murmur heard.  Pulmonary:      Effort: Pulmonary effort is normal. No respiratory distress.      Breath sounds: Normal breath sounds.   Musculoskeletal:         General: Normal range of motion.   Skin:     General: Skin is warm and dry.   Neurological:      General: No focal deficit present.      Mental Status: He is alert and oriented to person, place, and time.   Psychiatric:         Mood and Affect: Mood normal.         Behavior: Behavior normal.       Examination of the right knee: No Tenderness to palpation of the  medial joint line or lateral joint line.  No tenderness to the medial or lateral tibial plateau. No tenderness to the medial or lateral femoral condyles. No to the medial and lateral patella facets. Positive popliteal tenderness with palpation.  5/5 strength of the extensor mechanism. Neg Codie's sign. Neg Lachman's. Neg Anterior/Posterior drawer. Neg Varus/Valgus stress. There is no swelling. Sensation is grossly intact.  2+ DP " "pulse.             Labs:   Lab Results   Component Value Date/Time    CHOLSTRLTOT 192 10/17/2022 12:36 PM     (H) 10/17/2022 12:36 PM    HDL 41 10/17/2022 12:36 PM    TRIGLYCERIDE 222 (H) 10/17/2022 12:36 PM       Lab Results   Component Value Date/Time    SODIUM 138 10/17/2022 12:36 PM    POTASSIUM 5.0 10/17/2022 12:36 PM    CHLORIDE 101 10/17/2022 12:36 PM    CO2 26 10/17/2022 12:36 PM    GLUCOSE 93 10/17/2022 12:36 PM    BUN 16 10/17/2022 12:36 PM    CREATININE 0.94 10/17/2022 12:36 PM     Lab Results   Component Value Date/Time    ALKPHOSPHAT 48 10/17/2022 12:36 PM    ASTSGOT 22 10/17/2022 12:36 PM    ALTSGPT 30 10/17/2022 12:36 PM    TBILIRUBIN 0.7 10/17/2022 12:36 PM      Lab Results   Component Value Date/Time    HBA1C 5.1 10/17/2022 12:36 PM     No results found for: \"TSH\"  No results found for: \"FREET4\"  No results found for: \"CBC\"      No visits with results within 1 Month(s) from this visit.   Latest known visit with results is:   Office Visit on 2023   Component Date Value    Report 2023                      Value:[facility]    Test Date:  2023  Pt Name:    MEAGHAN DOTY          Department: [facility]  MRN:        [id number]                      Room:  Gender:     Male                         Technician:   :        1965                   Requested By:HUSSEIN JOHNSON  Order #:    [id number]                    Reading MD: Hussein Johnson MD    Measurements  Intervals                                Axis  Rate:       64                           P:          58  RI:         162                          QRS:        57  QRSD:       96                           T:          45  QT:         406  QTc:        420    Interpretive Statements  Sinus arrhythmia  No previous ECG available for comparison  Electronically Signed On 2023 13:39:33 PST by Hussein Johnson MD           Assessment & Plan: Medical Decision Making   59 y.o. male with the following " -    Assessment & Plan  1. Potential sleep apnea.  He reports snoring, daytime somnolence, and episodes of apnea witnessed by his wife. A referral to pulmonary sleep medicine at [facility] will be initiated for further evaluation.    2. Knee stiffness.  He reports stiffness in his knee for a couple of years, sometimes more pronounced. An ultrasound indicated a possible Baker's cyst. An x-ray of the knee will be ordered. A referral to orthopedics will be made for further evaluation and potential treatment options, including steroid injections or physical therapy.    3. Bilateral feet numbness.  He reports numbness in his feet, which varies in intensity and is sometimes accompanied by mild pain and prickliness. There is no lower back pain or sharp shooting pain in the buttocks or upper legs. Differential diagnosis includes peripheral neuropathy or nerve compression in the lower spine. He is advised to consider using a walking pad to improve circulation. An x-ray of the lower back will be ordered to investigate potential causes. Fasting labs will be ordered to check for diabetes, metabolic panel, blood counts, and cholesterol levels. If the x-ray shows degenerative disc disease or spondylosis, further steps may include a lumbar MRI or referral to a physiatrist.    4. Overweight BMI 33.81%  -Exercise: At least 150 minutes of moderate aerobic activity per week or 75 minutes of vigorous aerobic activity per week, +2 days/week of strength training  - Healthy lifestyle and eating habits: Mediterranean-based diet (rich in fruits, vegetables, nuts and healthy oils), proper hydration and avoiding sugary beverages, adequate sleep hygiene-(allowing 7 to 8 hours of overnight sleep).      Follow-up  The patient will follow up as needed, but a follow-up within a year is recommended.      Problem List Items Addressed This Visit       Obesity (BMI 30-39.9)    Relevant Orders    Patient identified as having weight management issue.   Appropriate orders and counseling given.    CBC WITH DIFFERENTIAL    Comp Metabolic Panel    HEMOGLOBIN A1C    Lipid Profile    Dyslipidemia    Relevant Orders    Comp Metabolic Panel    Lipid Profile     Other Visit Diagnoses         Snoring        Relevant Orders    Referral to Pulmonary and Sleep Medicine    CBC WITH DIFFERENTIAL      Daytime somnolence        Relevant Orders    Referral to Pulmonary and Sleep Medicine    CBC WITH DIFFERENTIAL      Chronic pain of right knee        Relevant Orders    DX-KNEE 2- RIGHT    Referral to Orthopedics      Numbness and tingling of both feet        Relevant Orders    DX-LUMBAR SPINE-2 OR 3 VIEWS      Screening for diabetes mellitus        Relevant Orders    HEMOGLOBIN A1C      Health care maintenance        Relevant Orders    CBC WITH DIFFERENTIAL    Comp Metabolic Panel    HEMOGLOBIN A1C    Lipid Profile            Differential diagnosis, natural history, supportive care, and indications for immediate follow-up discussed.  Shared decision making approach was utilized, and patient is amendable with plan of care.  Patient understands to return to clinic or go to the emergency department if symptoms worsen. All questions and concerns addressed to the best of my knowledge.      Return if symptoms worsen or fail to improve.    Please note that this dictation was created using voice recognition software. I have made every reasonable attempt to correct obvious errors, but I expect that there are errors of grammar and possibly content that I did not discover before finalizing the note.

## 2025-02-25 NOTE — Clinical Note
REFERRAL APPROVAL NOTICE         Sent on February 25, 2025                   Reinaldo Prabhakar  [address]  [place] [address]                   Dear Mr. Prabhakar,    After a careful review of the medical information and benefit coverage, [facility] has processed your referral. See below for additional details.    If applicable, you must be actively enrolled with your insurance for coverage of the authorized service. If you have any questions regarding your coverage, please contact your insurance directly.    REFERRAL INFORMATION   Referral #:  [id number]  Referred-To Department    Referred-By Provider:  Orthopedics    Antwon Ribeiro D.N.P.   Department Of Surgery      [address]  [place] [address]  [phone] [address], [address]  [place] [id number] [phone]    Referral Start Date:  02/24/2025  Referral End Date:   02/24/2026             SCHEDULING  If you do not already have an appointment, please call [phone] to make an appointment.     MORE INFORMATION  If you do not already have a [employer] account, sign up at: [email or web address]  You can access your medical information, make appointments, see lab results, billing information, and more.  If you have questions regarding this referral, please contact  the [facility] Referrals department at:             [phone]. Monday - Friday 8:00AM - 5:00PM.     Sincerely,    [facility]

## 2025-02-25 NOTE — Clinical Note
REFERRAL APPROVAL NOTICE         Sent on February 25, 2025                   Reinaldo Vanna  [address]  [place] [address]                   Dear Mr. Prabhakar,    After a careful review of the medical information and benefit coverage, [facility] has processed your referral. See below for additional details.    If applicable, you must be actively enrolled with your insurance for coverage of the authorized service. If you have any questions regarding your coverage, please contact your insurance directly.    REFERRAL INFORMATION   Referral #:  [id number]  Referred-To Provider    Referred-By Provider:  Sleep Medicine    Antwon Ribeiro D.N.P.   [facility]      [address]  [place] [address]  [phone] [id number]  [place] [address]  [phone]    Referral Start Date:  02/24/2025  Referral End Date:   02/24/2026             SCHEDULING  If you do not already have an appointment, please call [phone] to make an appointment.     MORE INFORMATION  If you do not already have a [employer] account, sign up at: [email or web address]  You can access your medical information, make appointments, see lab results, billing information, and more.  If you have questions regarding this referral, please contact  the [facility] Referrals department at:             [phone]. Monday - Friday 8:00AM - 5:00PM.     Sincerely,    [facility]

## 2025-03-20 DIAGNOSIS — M25.561 RIGHT KNEE PAIN, UNSPECIFIED CHRONICITY: ICD-10-CM

## 2025-03-31 ENCOUNTER — RESULTS FOLLOW-UP (OUTPATIENT)
Dept: MEDICAL GROUP | Facility: PHYSICIAN GROUP | Age: 60
End: 2025-03-31

## 2025-03-31 ENCOUNTER — HOSPITAL ENCOUNTER (OUTPATIENT)
Dept: RADIOLOGY | Facility: MEDICAL CENTER | Age: 60
End: 2025-03-31
Payer: COMMERCIAL

## 2025-03-31 DIAGNOSIS — R20.0 NUMBNESS AND TINGLING OF BOTH FEET: ICD-10-CM

## 2025-03-31 DIAGNOSIS — R20.2 NUMBNESS AND TINGLING OF BOTH FEET: ICD-10-CM

## 2025-03-31 DIAGNOSIS — G89.29 CHRONIC PAIN OF RIGHT KNEE: ICD-10-CM

## 2025-03-31 DIAGNOSIS — M25.561 CHRONIC PAIN OF RIGHT KNEE: ICD-10-CM

## 2025-03-31 PROCEDURE — 73560 X-RAY EXAM OF KNEE 1 OR 2: CPT | Mod: RT

## 2025-03-31 PROCEDURE — 72100 X-RAY EXAM L-S SPINE 2/3 VWS: CPT

## 2025-04-01 ENCOUNTER — APPOINTMENT (OUTPATIENT)
Facility: MEDICAL CENTER | Age: 60
End: 2025-04-01
Payer: COMMERCIAL

## 2025-04-01 VITALS
HEIGHT: 76 IN | WEIGHT: 286.6 LBS | OXYGEN SATURATION: 98 % | BODY MASS INDEX: 34.9 KG/M2 | TEMPERATURE: 96.1 F | HEART RATE: 82 BPM

## 2025-04-01 DIAGNOSIS — M17.11 PRIMARY OSTEOARTHRITIS OF RIGHT KNEE: ICD-10-CM

## 2025-04-01 PROCEDURE — 99203 OFFICE O/P NEW LOW 30 MIN: CPT | Performed by: ORTHOPAEDIC SURGERY

## 2025-04-01 ASSESSMENT — ENCOUNTER SYMPTOMS
WEAKNESS: 0
SENSORY CHANGE: 1
BACK PAIN: 0
MYALGIAS: 0

## 2025-04-01 NOTE — PROGRESS NOTES
[facility] Orthopedic Surgery    Subjective:   4/1/2025 10:05 AM  Primary care physician:Antwon Ribeiro D.N.P.    Chief Complaint:   Right knee swelling/stiffness    History of presenting illness:  Reinaldo Prabhakar  is a pleasant 59 y.o. male who was referred for evaluation of right knee pain.  He reports he actually has very little pain, but intermittently feels fullness and swelling in the posterior aspect of the knee.  This is associated with stiffness and causes him to have a stiff gait.  He also reports he previously had a vein procedure and when they were ultrasounding his leg mentioned that he had fluid behind his knee.  There was no injury or trauma.No catching/locking. Therapies tried include none.  He has noticed the swelling and stiffness over the last couple of years.  He does have some numbness in his bilateral feet that his primary care physician is working up.    History of diabetes: no  History of tobacco use:  intermittent cigars  History of renal disease: no  Use of anticoagulants: no  Prior surgeries on this limb/joint: no    Past Medical History:   Diagnosis Date    Hemorrhoids      Past Surgical History:   Procedure Laterality Date    TONSILLECTOMY       No Known Allergies  No outpatient encounter medications on file as of 4/1/2025.     No facility-administered encounter medications on file as of 4/1/2025.     Social History     Socioeconomic History    Marital status:      Spouse name: Not on file    Number of children: Not on file    Years of education: Not on file    Highest education level: Not on file   Occupational History    Not on file   Tobacco Use    Smoking status: Former     Types: Cigars    Smokeless tobacco: Never   Vaping Use    Vaping status: Never Used   Substance and Sexual Activity    Alcohol use: Yes     Alcohol/week: 7.2 oz     Types: 12 Cans of beer per week    Drug use: Yes     Types: Marijuana    Sexual activity: Not on file   Other Topics Concern    Not on file  "  Social History Narrative    Not on file     Social Drivers of Health     Financial Resource Strain: Not on file   Food Insecurity: Not on file   Transportation Needs: Not on file   Physical Activity: Not on file   Stress: Not on file   Social Connections: Not on file   Intimate Partner Violence: Not on file   Housing Stability: Not on file      Social History     Tobacco Use   Smoking Status Former    Types: Cigars   Smokeless Tobacco Never     Social History     Substance and Sexual Activity   Alcohol Use Yes    Alcohol/week: 7.2 oz    Types: 12 Cans of beer per week     Social History     Substance and Sexual Activity   Drug Use Yes    Types: Marijuana        Family History   Problem Relation Age of Onset    No Known Problems Mother     Heart Disease Father     Heart Disease Brother     Diabetes Brother     Hypertension Paternal Grandfather        Review of Systems   Musculoskeletal:  Negative for back pain, joint pain and myalgias.   Neurological:  Positive for sensory change. Negative for weakness.        Numbness in BL feet          Objective:   Pulse 82   Temp (!) 35.6 °C (96.1 °F) (Temporal)   Ht 1.93 m (6' 4\")   Wt (!) 130 kg (286 lb 9.6 oz)   SpO2 98%   BMI 34.89 kg/m²     Physical Exam  Constitutional:       General: He is not in acute distress.     Appearance: Normal appearance.   HENT:      Head: Normocephalic and atraumatic.      Right Ear: External ear normal.      Left Ear: External ear normal.      Nose: Nose normal.      Mouth/Throat:      Mouth: Mucous membranes are moist.   Eyes:      Extraocular Movements: Extraocular movements intact.      Conjunctiva/sclera: Conjunctivae normal.   Cardiovascular:      Rate and Rhythm: Normal rate.      Pulses: Normal pulses.   Pulmonary:      Effort: Pulmonary effort is normal. No respiratory distress.   Musculoskeletal:      Cervical back: Normal range of motion and neck supple.   Skin:     General: Skin is warm and dry.      Capillary Refill: Capillary " refill takes less than 2 seconds.   Neurological:      Mental Status: He is alert and oriented to person, place, and time.   Psychiatric:         Mood and Affect: Mood normal.         Behavior: Behavior normal.     Knee Exam -   right knee: Overlying skin demonstrates no erythema, ecchymoses or wounds. Knee ROM is full.  SILT spn, dpn, plantar and sural nerves. Motor intact to knee extension, ankle dorsiflexion, ankle plantar flexion, and eversion. DP/PT pulse 2+. There is no tenderness at the medial compartment , lateral compartment, and patellofemoral compartment joint line. negative Lachman. negative Codie. stable varus/valgus stress. Fullness in the popliteal fossa. No discrete mass         Imaging:  Multiple views of the right knee demonstrates mild degenerative changes including joint space narrowing and periarticular osteophytes of the medial compartment , lateral compartment of the right knee. There are no acute fractures or destructive osseous lesions.         Diagnosis:     1. Primary osteoarthritis of right knee                Assessment/Plan:   I counseled the patient regarding the above diagnosis.  We reviewed the natural history and etiology of knee osteoarthritis. We discussed conservative and surgical treatment options in detail. Conservative treatment options include weight loss, low impact exercise (pool, bike, etc), oral NSAIDs, corticosteroid injections, bracing, and ablative procedures of the sensory nerves to the joint. Surgical treatment includes joint arthroplasty.     We also discussed the fullness he feels in the popliteal fossa intermittently is most likely a Baker's cyst.  I do not recommend draining or surgically excising Baker's cyst.  They have high recurrence rates with this.  We discussed they are resulted in intra-articular pathology.  I did  him that if he ever feels a mass in the popliteal fossa he should call for follow-up and further evaluation.  Currently he is not  having any knee pain and thus does not require treatment for his arthritis.  He will follow-up on an as-needed basis.    All questions were answered and they were in agreement with the plan.     Referrals: none  Restrictions: none  New medications/refills: none  Imaging studies: none  Follow-up: as needed      Neptali Coates, DO  Orthopedic Oncology and General Orthopedics

## 2025-05-02 ENCOUNTER — HOSPITAL ENCOUNTER (OUTPATIENT)
Dept: LAB | Facility: MEDICAL CENTER | Age: 60
End: 2025-05-02
Payer: OTHER MISCELLANEOUS

## 2025-05-02 DIAGNOSIS — E78.5 DYSLIPIDEMIA: ICD-10-CM

## 2025-05-02 DIAGNOSIS — Z13.1 SCREENING FOR DIABETES MELLITUS: ICD-10-CM

## 2025-05-02 DIAGNOSIS — E66.9 OBESITY (BMI 30-39.9): ICD-10-CM

## 2025-05-02 DIAGNOSIS — R06.83 SNORING: ICD-10-CM

## 2025-05-02 DIAGNOSIS — R40.0 DAYTIME SOMNOLENCE: ICD-10-CM

## 2025-05-02 DIAGNOSIS — Z00.00 HEALTH CARE MAINTENANCE: ICD-10-CM

## 2025-05-02 LAB
ALBUMIN SERPL BCP-MCNC: 4.4 G/DL (ref 3.2–4.9)
ALBUMIN/GLOB SERPL: 1.6 G/DL
ALP SERPL-CCNC: 43 U/L (ref 30–99)
ALT SERPL-CCNC: 84 U/L (ref 2–50)
ANION GAP SERPL CALC-SCNC: 11 MMOL/L (ref 7–16)
AST SERPL-CCNC: 44 U/L (ref 12–45)
BASOPHILS # BLD AUTO: 0.6 % (ref 0–1.8)
BASOPHILS # BLD AUTO: 0.6 % (ref 0–1.8)
BASOPHILS # BLD: 0.03 K/UL (ref 0–0.12)
BASOPHILS # BLD: 0.03 K/UL (ref 0–0.12)
BILIRUB SERPL-MCNC: 0.7 MG/DL (ref 0.1–1.5)
BUN SERPL-MCNC: 19 MG/DL (ref 8–22)
CALCIUM ALBUM COR SERPL-MCNC: 8.8 MG/DL (ref 8.5–10.5)
CALCIUM SERPL-MCNC: 9.1 MG/DL (ref 8.5–10.5)
CHLORIDE SERPL-SCNC: 106 MMOL/L (ref 96–112)
CHOLEST SERPL-MCNC: 202 MG/DL (ref 100–199)
CO2 SERPL-SCNC: 24 MMOL/L (ref 20–33)
CREAT SERPL-MCNC: 0.9 MG/DL (ref 0.5–1.4)
EOSINOPHIL # BLD AUTO: 0.29 K/UL (ref 0–0.51)
EOSINOPHIL # BLD AUTO: 0.33 K/UL (ref 0–0.51)
EOSINOPHIL NFR BLD: 5.8 % (ref 0–6.9)
EOSINOPHIL NFR BLD: 6.3 % (ref 0–6.9)
ERYTHROCYTE [DISTWIDTH] IN BLOOD BY AUTOMATED COUNT: 42.1 FL (ref 35.9–50)
ERYTHROCYTE [DISTWIDTH] IN BLOOD BY AUTOMATED COUNT: 42.7 FL (ref 35.9–50)
EST. AVERAGE GLUCOSE BLD GHB EST-MCNC: 100 MG/DL
EST. AVERAGE GLUCOSE BLD GHB EST-MCNC: 103 MG/DL
GFR SERPLBLD CREATININE-BSD FMLA CKD-EPI: 99 ML/MIN/1.73 M 2
GLOBULIN SER CALC-MCNC: 2.7 G/DL (ref 1.9–3.5)
GLUCOSE SERPL-MCNC: 111 MG/DL (ref 65–99)
HBA1C MFR BLD: 5.1 % (ref 4–5.6)
HBA1C MFR BLD: 5.2 % (ref 4–5.6)
HCT VFR BLD AUTO: 44.9 % (ref 42–52)
HCT VFR BLD AUTO: 45.1 % (ref 42–52)
HDLC SERPL-MCNC: 36 MG/DL
HGB BLD-MCNC: 15.7 G/DL (ref 14–18)
HGB BLD-MCNC: 15.9 G/DL (ref 14–18)
IMM GRANULOCYTES # BLD AUTO: 0.03 K/UL (ref 0–0.11)
IMM GRANULOCYTES # BLD AUTO: 0.04 K/UL (ref 0–0.11)
IMM GRANULOCYTES NFR BLD AUTO: 0.6 % (ref 0–0.9)
IMM GRANULOCYTES NFR BLD AUTO: 0.8 % (ref 0–0.9)
LDLC SERPL CALC-MCNC: 110 MG/DL
LYMPHOCYTES # BLD AUTO: 1.71 K/UL (ref 1–4.8)
LYMPHOCYTES # BLD AUTO: 1.79 K/UL (ref 1–4.8)
LYMPHOCYTES NFR BLD: 34 % (ref 22–41)
LYMPHOCYTES NFR BLD: 34.1 % (ref 22–41)
MCH RBC QN AUTO: 32 PG (ref 27–33)
MCH RBC QN AUTO: 32.7 PG (ref 27–33)
MCHC RBC AUTO-ENTMCNC: 34.8 G/DL (ref 32.3–36.5)
MCHC RBC AUTO-ENTMCNC: 35.4 G/DL (ref 32.3–36.5)
MCV RBC AUTO: 92 FL (ref 81.4–97.8)
MCV RBC AUTO: 92.4 FL (ref 81.4–97.8)
MONOCYTES # BLD AUTO: 0.52 K/UL (ref 0–0.85)
MONOCYTES # BLD AUTO: 0.58 K/UL (ref 0–0.85)
MONOCYTES NFR BLD AUTO: 10.4 % (ref 0–13.4)
MONOCYTES NFR BLD AUTO: 11 % (ref 0–13.4)
NEUTROPHILS # BLD AUTO: 2.43 K/UL (ref 1.82–7.42)
NEUTROPHILS # BLD AUTO: 2.49 K/UL (ref 1.82–7.42)
NEUTROPHILS NFR BLD: 47.3 % (ref 44–72)
NEUTROPHILS NFR BLD: 48.5 % (ref 44–72)
NRBC # BLD AUTO: 0 K/UL
NRBC # BLD AUTO: 0 K/UL
NRBC BLD-RTO: 0 /100 WBC (ref 0–0.2)
NRBC BLD-RTO: 0 /100 WBC (ref 0–0.2)
PLATELET # BLD AUTO: 119 K/UL (ref 164–446)
PLATELET # BLD AUTO: 128 K/UL (ref 164–446)
PMV BLD AUTO: 11.8 FL (ref 9–12.9)
PMV BLD AUTO: 11.9 FL (ref 9–12.9)
POTASSIUM SERPL-SCNC: 4.6 MMOL/L (ref 3.6–5.5)
PROT SERPL-MCNC: 7.1 G/DL (ref 6–8.2)
RBC # BLD AUTO: 4.86 M/UL (ref 4.7–6.1)
RBC # BLD AUTO: 4.9 M/UL (ref 4.7–6.1)
SODIUM SERPL-SCNC: 141 MMOL/L (ref 135–145)
TRIGL SERPL-MCNC: 278 MG/DL (ref 0–149)
WBC # BLD AUTO: 5 K/UL (ref 4.8–10.8)
WBC # BLD AUTO: 5.3 K/UL (ref 4.8–10.8)

## 2025-05-02 PROCEDURE — 84207 ASSAY OF VITAMIN B-6: CPT

## 2025-05-02 PROCEDURE — 86800 THYROGLOBULIN ANTIBODY: CPT

## 2025-05-02 PROCEDURE — 85025 COMPLETE CBC W/AUTO DIFF WBC: CPT | Mod: 91

## 2025-05-02 PROCEDURE — 83036 HEMOGLOBIN GLYCOSYLATED A1C: CPT | Mod: 91

## 2025-05-02 PROCEDURE — 86376 MICROSOMAL ANTIBODY EACH: CPT

## 2025-05-02 PROCEDURE — 84439 ASSAY OF FREE THYROXINE: CPT

## 2025-05-02 PROCEDURE — 84403 ASSAY OF TOTAL TESTOSTERONE: CPT

## 2025-05-02 PROCEDURE — 80061 LIPID PANEL: CPT

## 2025-05-02 PROCEDURE — 85025 COMPLETE CBC W/AUTO DIFF WBC: CPT

## 2025-05-02 PROCEDURE — 36415 COLL VENOUS BLD VENIPUNCTURE: CPT

## 2025-05-02 PROCEDURE — 80053 COMPREHEN METABOLIC PANEL: CPT

## 2025-05-02 PROCEDURE — 84481 FREE ASSAY (FT-3): CPT

## 2025-05-02 PROCEDURE — 82728 ASSAY OF FERRITIN: CPT

## 2025-05-02 PROCEDURE — 83704 LIPOPROTEIN BLD QUAN PART: CPT

## 2025-05-02 PROCEDURE — 84443 ASSAY THYROID STIM HORMONE: CPT

## 2025-05-02 PROCEDURE — 83036 HEMOGLOBIN GLYCOSYLATED A1C: CPT

## 2025-05-02 PROCEDURE — 83921 ORGANIC ACID SINGLE QUANT: CPT

## 2025-05-02 PROCEDURE — 82306 VITAMIN D 25 HYDROXY: CPT

## 2025-05-02 PROCEDURE — 80053 COMPREHEN METABOLIC PANEL: CPT | Mod: 91

## 2025-05-02 PROCEDURE — 86141 C-REACTIVE PROTEIN HS: CPT

## 2025-05-03 LAB
25(OH)D3 SERPL-MCNC: 20 NG/ML (ref 30–100)
ALBUMIN SERPL BCP-MCNC: 4.4 G/DL (ref 3.2–4.9)
ALBUMIN/GLOB SERPL: 1.6 G/DL
ALP SERPL-CCNC: 44 U/L (ref 30–99)
ALT SERPL-CCNC: 85 U/L (ref 2–50)
ANION GAP SERPL CALC-SCNC: 12 MMOL/L (ref 7–16)
AST SERPL-CCNC: 44 U/L (ref 12–45)
BILIRUB SERPL-MCNC: 0.7 MG/DL (ref 0.1–1.5)
BUN SERPL-MCNC: 19 MG/DL (ref 8–22)
CALCIUM ALBUM COR SERPL-MCNC: 8.8 MG/DL (ref 8.5–10.5)
CALCIUM SERPL-MCNC: 9.1 MG/DL (ref 8.5–10.5)
CHLORIDE SERPL-SCNC: 106 MMOL/L (ref 96–112)
CO2 SERPL-SCNC: 22 MMOL/L (ref 20–33)
CREAT SERPL-MCNC: 0.89 MG/DL (ref 0.5–1.4)
CRP SERPL HS-MCNC: 0.7 MG/L (ref 0–3)
FERRITIN SERPL-MCNC: 591 NG/ML (ref 22–322)
GFR SERPLBLD CREATININE-BSD FMLA CKD-EPI: 100 ML/MIN/1.73 M 2
GLOBULIN SER CALC-MCNC: 2.7 G/DL (ref 1.9–3.5)
GLUCOSE SERPL-MCNC: 109 MG/DL (ref 65–99)
POTASSIUM SERPL-SCNC: 4.5 MMOL/L (ref 3.6–5.5)
PROT SERPL-MCNC: 7.1 G/DL (ref 6–8.2)
SODIUM SERPL-SCNC: 140 MMOL/L (ref 135–145)
T3FREE SERPL-MCNC: 3.04 PG/ML (ref 2–4.4)
T4 FREE SERPL-MCNC: 1.3 NG/DL (ref 0.93–1.7)
TESTOST SERPL-MCNC: 303 NG/DL (ref 175–781)
THYROPEROXIDASE AB SERPL-ACNC: 12.3 IU/ML (ref 0–9)
TSH SERPL-ACNC: 2.09 UIU/ML (ref 0.38–5.33)

## 2025-05-04 ENCOUNTER — RESULTS FOLLOW-UP (OUTPATIENT)
Dept: MEDICAL GROUP | Facility: PHYSICIAN GROUP | Age: 60
End: 2025-05-04

## 2025-05-04 LAB — THYROGLOB AB SERPL-ACNC: <1.5 IU/ML (ref 0–4)

## 2025-05-07 LAB
METHYLMALONATE SERPL-SCNC: 0.12 UMOL/L (ref 0–0.4)
VIT B6 SERPL-MCNC: 336.4 NMOL/L (ref 20–125)

## 2025-05-08 LAB
CHOLEST SERPL-MCNC: 210 MG/DL
HDL PARTICAL NO Q4363: 37.3 UMOL/L
HDL SIZE Q4361: <8 NM
HDLC SERPL-MCNC: 35 MG/DL (ref 40–59)
HLD.LARGE SERPL-SCNC: <2.8 UMOL/L
L VLDL PART NO Q4357: 17.3 NMOL/L
LDL SERPL QN: 19.7 NM
LDL SERPL-SCNC: 2295 NMOL/L
LDL SMALL SERPL-SCNC: >1085 NMOL/L
LDLC SERPL CALC-MCNC: 117 MG/DL
PATHOLOGY STUDY: ABNORMAL
TRIGL SERPL-MCNC: 289 MG/DL (ref 30–149)
VLDL SIZE Q4362: 66.6 NM